# Patient Record
Sex: FEMALE | Race: WHITE | Employment: OTHER | URBAN - METROPOLITAN AREA
[De-identification: names, ages, dates, MRNs, and addresses within clinical notes are randomized per-mention and may not be internally consistent; named-entity substitution may affect disease eponyms.]

---

## 2023-03-03 LAB
NON-UH HIE A/G RATIO: 1.3
NON-UH HIE ALB: 3.7 G/DL (ref 3.4–5)
NON-UH HIE ALK PHOS: 71 UNIT/L (ref 46–116)
NON-UH HIE BILIRUBIN, TOTAL: 0.4 MG/DL (ref 0.2–1)
NON-UH HIE BUN/CREAT RATIO: 15.6
NON-UH HIE BUN: 14 MG/DL (ref 9–23)
NON-UH HIE CALCIUM: 9.1 MG/DL (ref 8.7–10.4)
NON-UH HIE CALCULATED LDL CHOLESTEROL: 53 MG/DL (ref 60–130)
NON-UH HIE CALCULATED OSMOLALITY: 284 MOSM/KG (ref 275–295)
NON-UH HIE CHLORIDE: 107 MMOL/L (ref 98–107)
NON-UH HIE CHOLESTEROL: 144 MG/DL (ref 100–200)
NON-UH HIE CO2, VENOUS: 31 MMOL/L (ref 20–31)
NON-UH HIE CREATININE: 0.9 MG/DL (ref 0.5–0.8)
NON-UH HIE GFR AA: >60
NON-UH HIE GLOBULIN: 2.9 G/DL
NON-UH HIE GLOMERULAR FILTRATION RATE: >60 ML/MIN/1.73M?
NON-UH HIE GLUCOSE: 97 MG/DL (ref 74–106)
NON-UH HIE GOT: 15 UNIT/L (ref 15–37)
NON-UH HIE GPT: 11 UNIT/L (ref 10–49)
NON-UH HIE HCT: 40.3 % (ref 36–46)
NON-UH HIE HDL CHOLESTEROL: 77 MG/DL (ref 40–60)
NON-UH HIE HGB: 13.3 G/DL (ref 12–16)
NON-UH HIE INSTR WBC ND: 4.9
NON-UH HIE K: 4.4 MMOL/L (ref 3.5–5.1)
NON-UH HIE MCH: 28.7 PG (ref 27–34)
NON-UH HIE MCHC: 33.2 G/DL (ref 32–37)
NON-UH HIE MCV: 86.6 FL (ref 80–100)
NON-UH HIE MPV: 8.8 FL (ref 7.4–10.4)
NON-UH HIE NA: 142 MMOL/L (ref 135–145)
NON-UH HIE PLATELET: 259 X10 (ref 150–450)
NON-UH HIE RBC: 4.65 X10 (ref 4.2–5.4)
NON-UH HIE RDW: 14.1 % (ref 11.5–14.5)
NON-UH HIE TOTAL CHOL/HDL CHOL RATIO: 1.9
NON-UH HIE TOTAL PROTEIN: 6.6 G/DL (ref 5.7–8.2)
NON-UH HIE TRIGLYCERIDES: 71 MG/DL (ref 30–150)
NON-UH HIE TSH: 2.55 UIU/ML (ref 0.55–4.78)
NON-UH HIE VIT D 25: 21 NG/ML
NON-UH HIE WBC: 4.9 X10 (ref 4.5–11)

## 2023-03-14 ENCOUNTER — TELEPHONE (OUTPATIENT)
Dept: PRIMARY CARE | Facility: CLINIC | Age: 68
End: 2023-03-14
Payer: MEDICARE

## 2023-03-14 NOTE — TELEPHONE ENCOUNTER
----- Message from Gay Colón DO sent at 3/13/2023  8:33 PM EDT -----  Please let pt know that her  blood counts, sugar, electrolytes, thyroid, and liver function are all normal and her cholesterol was good.   Her kidney function was slightly elevated at 0.9 (should be less than 0.8).  We will continue to monitor.  Her vitamin D level is low at 21(should be above 30).  I recommend they  take at least 2000 units of an OTC vitamin D supplement daily and we will cont to monitor.      ----- Message -----  From: Mary Moyer CMA  Sent: 3/13/2023   8:23 AM EDT  To: Gay Colón DO

## 2023-03-29 ENCOUNTER — TELEPHONE (OUTPATIENT)
Dept: PRIMARY CARE | Facility: CLINIC | Age: 68
End: 2023-03-29
Payer: MEDICARE

## 2023-03-29 NOTE — TELEPHONE ENCOUNTER
----- Message from Gay Colón DO sent at 3/28/2023  3:06 PM EDT -----  Let pt know her mammogram was normal.  Repeat in 1 year.

## 2023-06-30 DIAGNOSIS — U07.1 COVID-19: Primary | ICD-10-CM

## 2023-06-30 RX ORDER — NIRMATRELVIR AND RITONAVIR 300-100 MG
3 KIT ORAL 2 TIMES DAILY
Qty: 30 TABLET | Refills: 0 | Status: SHIPPED | OUTPATIENT
Start: 2023-06-30 | End: 2023-07-05

## 2024-01-23 DIAGNOSIS — M05.79 RHEUMATOID ARTHRITIS OF MULTIPLE SITES WITHOUT ORGAN OR SYSTEM INVOLVEMENT WITH POSITIVE RHEUMATOID FACTOR (MULTI): Primary | ICD-10-CM

## 2024-01-23 PROBLEM — M81.0 OSTEOPOROSIS: Status: ACTIVE | Noted: 2024-01-23

## 2024-01-23 PROBLEM — R79.89 LOW VITAMIN D LEVEL: Status: ACTIVE | Noted: 2024-01-23

## 2024-01-23 RX ORDER — HYDROXYCHLOROQUINE SULFATE 200 MG/1
TABLET, FILM COATED ORAL DAILY
Qty: 90 TABLET | Refills: 3 | Status: SHIPPED | OUTPATIENT
Start: 2024-01-23

## 2024-01-23 RX ORDER — CALCIUM CARBONATE 600 MG
1 TABLET ORAL DAILY
COMMUNITY
Start: 2021-02-11

## 2024-01-23 RX ORDER — IBUPROFEN 200 MG
1 TABLET ORAL 3 TIMES DAILY PRN
COMMUNITY
Start: 2019-01-08

## 2024-01-23 RX ORDER — VIT C/E/ZN/COPPR/LUTEIN/ZEAXAN 250MG-90MG
25 CAPSULE ORAL DAILY
COMMUNITY
Start: 2019-03-12

## 2024-02-13 PROBLEM — Z51.81 ENCOUNTER FOR MONITORING OF HYDROXYCHLOROQUINE THERAPY: Status: ACTIVE | Noted: 2024-02-13

## 2024-02-13 PROBLEM — Z79.899 ENCOUNTER FOR MONITORING OF HYDROXYCHLOROQUINE THERAPY: Status: ACTIVE | Noted: 2024-02-13

## 2024-02-15 ENCOUNTER — OFFICE VISIT (OUTPATIENT)
Dept: RHEUMATOLOGY | Facility: CLINIC | Age: 69
End: 2024-02-15
Payer: MEDICARE

## 2024-02-15 VITALS
HEART RATE: 79 BPM | WEIGHT: 191.4 LBS | HEIGHT: 66 IN | BODY MASS INDEX: 30.76 KG/M2 | SYSTOLIC BLOOD PRESSURE: 142 MMHG | TEMPERATURE: 97 F | OXYGEN SATURATION: 99 % | DIASTOLIC BLOOD PRESSURE: 93 MMHG

## 2024-02-15 DIAGNOSIS — Z79.899 ENCOUNTER FOR MONITORING OF HYDROXYCHLOROQUINE THERAPY: ICD-10-CM

## 2024-02-15 DIAGNOSIS — M05.79 RHEUMATOID ARTHRITIS OF MULTIPLE SITES WITHOUT ORGAN OR SYSTEM INVOLVEMENT WITH POSITIVE RHEUMATOID FACTOR (MULTI): Primary | ICD-10-CM

## 2024-02-15 DIAGNOSIS — Z51.81 ENCOUNTER FOR MONITORING OF HYDROXYCHLOROQUINE THERAPY: ICD-10-CM

## 2024-02-15 PROCEDURE — 1125F AMNT PAIN NOTED PAIN PRSNT: CPT | Performed by: INTERNAL MEDICINE

## 2024-02-15 PROCEDURE — 1159F MED LIST DOCD IN RCRD: CPT | Performed by: INTERNAL MEDICINE

## 2024-02-15 PROCEDURE — 1036F TOBACCO NON-USER: CPT | Performed by: INTERNAL MEDICINE

## 2024-02-15 PROCEDURE — 1123F ACP DISCUSS/DSCN MKR DOCD: CPT | Performed by: INTERNAL MEDICINE

## 2024-02-15 PROCEDURE — 1160F RVW MEDS BY RX/DR IN RCRD: CPT | Performed by: INTERNAL MEDICINE

## 2024-02-15 PROCEDURE — 99214 OFFICE O/P EST MOD 30 MIN: CPT | Performed by: INTERNAL MEDICINE

## 2024-02-15 ASSESSMENT — PATIENT HEALTH QUESTIONNAIRE - PHQ9
SUM OF ALL RESPONSES TO PHQ9 QUESTIONS 1 & 2: 0
1. LITTLE INTEREST OR PLEASURE IN DOING THINGS: NOT AT ALL
2. FEELING DOWN, DEPRESSED OR HOPELESS: NOT AT ALL

## 2024-02-15 ASSESSMENT — ENCOUNTER SYMPTOMS
NUMBNESS: 0
FATIGUE: 0
COUGH: 0
WEAKNESS: 0
STIFFNESS: 1
ARTHRALGIAS: 1
SHORTNESS OF BREATH: 0
MYALGIAS: 0
JOINT SWELLING: 0
BACK PAIN: 0
COLOR CHANGE: 0
FEVER: 0

## 2024-02-15 NOTE — ASSESSMENT & PLAN NOTE
Well controlled with plaquenil.   Meets remission criteria with only minimal AM stiffness  Continue meds.  Labs to be done by PCP next month.  Eye exam scheduled for next month

## 2024-02-15 NOTE — PROGRESS NOTES
Chief Complaint   Patient presents with    Follow-up    Rheumatoid Arthritis       SUBJECTIVE  Arthritis  Presents for follow-up visit. She complains of stiffness. She reports no pain or joint swelling. The symptoms have been stable. Pertinent negatives include no fatigue, fever or rash. (Overall doing well.  Wakes up with stiffness but gone by the time she eats breakfast.  No problems   Had one flare in the last year that was treated with steroid taper and doing well ever since) Compliance with total regimen is %. Compliance with medications is %.     Patient Active Problem List    Diagnosis Date Noted    Encounter for monitoring of hydroxychloroquine therapy 02/13/2024    Low vitamin D level 01/23/2024    Osteoporosis 01/23/2024    Rheumatoid arthritis of multiple sites without organ or system involvement with positive rheumatoid factor (CMS/[facility]) 01/23/2024     Past Medical History:   Diagnosis Date    Colon polyp 02/15/2008    Formatting of this note might be different from the original. Tubular adenoma 2006 repeat in 2011    Consumes 2 to 3 servings of caffeine per day     De Quervain's disease (radial styloid tenosynovitis) 11/22/2015    Tendinitis 12/10/2015    History of tendinitis    Uterine fibroid 02/15/2008    Uterine prolapse 01/29/2008     Current Outpatient Medications   Medication Instructions    calcium carbonate 600 mg calcium (1,500 mg) tablet 1 tablet, oral, Daily    cholecalciferol (VITAMIN D-3) 25 mcg, oral, Daily    hydroxychloroquine (Plaquenil) 200 mg tablet oral, Daily    ibuprofen (AdviL) 200 mg tablet 1 tablet, oral, 3 times daily PRN     Allergies   Allergen Reactions    Penicillins Hives, Itching and Other     11/07/2005 UNKNOWN, PLEASE VERIFY, 11/07/2005 UNKNOWN, PLEASE VERIFY    Shrimp Unknown     Review of Systems   Constitutional:  Negative for fatigue and fever.   Respiratory:  Negative for cough and shortness of breath.    Cardiovascular:  Negative for leg swelling.  "  Musculoskeletal:  Positive for arthralgias, arthritis and stiffness. Negative for back pain, gait problem, joint swelling and myalgias.   Skin:  Negative for color change and rash.   Neurological:  Negative for weakness and numbness.       PHYSICAL EXAM  BP (!) 142/93 (BP Location: Left arm, Patient Position: Sitting, BP Cuff Size: Adult)   Pulse 79   Temp 36.1 °C (97 °F) (Temporal)   Ht 1.676 m (5' 6\")   Wt 86.8 kg (191 lb 6.4 oz)   SpO2 99%   BMI 30.89 kg/m²   Physical Exam  Vitals reviewed.   Constitutional:       General: She is not in acute distress.     Appearance: Normal appearance.   HENT:      Head: Normocephalic and atraumatic.   Eyes:      Conjunctiva/sclera: Conjunctivae normal.   Pulmonary:      Effort: Pulmonary effort is normal. No respiratory distress.   Musculoskeletal:         General: No swelling, tenderness or deformity.      Cervical back: Normal range of motion.      Right lower leg: No edema.      Left lower leg: No edema.      Comments: No synovitis of examined joints   Skin:     Coloration: Skin is not pale.      Findings: No erythema or rash.   Neurological:      General: No focal deficit present.      Mental Status: She is alert and oriented to person, place, and time. Mental status is at baseline.      Gait: Gait normal.   Psychiatric:         Mood and Affect: Mood normal.         Assessment/plan  Problem List Items Addressed This Visit       Rheumatoid arthritis of multiple sites without organ or system involvement with positive rheumatoid factor (CMS/[facility]) - Primary    Current Assessment & Plan     Well controlled with plaquenil.   Meets remission criteria with only minimal AM stiffness  Continue meds.  Labs to be done by PCP next month.  Eye exam scheduled for next month         Encounter for monitoring of hydroxychloroquine therapy    Overview     Eye exam 3/23         Current Assessment & Plan     You are on chronic plaquenil.     Make sure you see your eye doctor yearly.  If " you need an eye doctor, let me know and I can place a referral    Plaquenil is dosed based on your weight.   We will make sure your dose is below the maximum daily dose of 5mg/kg            Follow up: ___12__months

## 2024-02-15 NOTE — PATIENT INSTRUCTIONS
It was a pleasure to see you today  Please call if your symptoms worsen  Please review your summary for education and reminders.  Follow up at your next appointment.    If you had labs/xrays done today, you will be able to view on [employer].   We will contact you when the results are reviewed for further discussion.  Please note that you may receive your results before I have had a chance to review.  Please know I will be contacting you for discussion  Homegoing instructions for all patient  A healthy lifestyle helps chronic diseases  These are all the goals you should strive to improve your overall health   Blood pressure <130/85   BMI of <30 or waist circumference that is 1/2 of your height   Fasting blood sugar <107 (if you are diabetic, aim for an A1c <6.4%_   LDL cholesterol <130   Avoid smoking   Manage your stress   Get your preventive exams   Get your immunizations

## 2024-02-15 NOTE — LETTER
February 15, 2024     Gay Colón DO  [id number] [address]  [id number]  [place] [address]    Patient: Karly Boothe   YOB: 1955   Date of Visit: 2/15/2024       Dear Dr. Gay Colón DO:    Thank you for referring Karly Boothe to me for evaluation. Below are my notes for this consultation.  If you have questions, please do not hesitate to call me. I look forward to following your patient along with you.       Sincerely,     Jen Sanchez MD      CC: No Recipients  ______________________________________________________________________________________    Chief Complaint   Patient presents with   • Follow-up   • Rheumatoid Arthritis       SUBJECTIVE  Arthritis  Presents for follow-up visit. She complains of stiffness. She reports no pain or joint swelling. The symptoms have been stable. Pertinent negatives include no fatigue, fever or rash. (Overall doing well.  Wakes up with stiffness but gone by the time she eats breakfast.  No problems   Had one flare in the last year that was treated with steroid taper and doing well ever since) Compliance with total regimen is %. Compliance with medications is %.     Patient Active Problem List    Diagnosis Date Noted   • Encounter for monitoring of hydroxychloroquine therapy 02/13/2024   • Low vitamin D level 01/23/2024   • Osteoporosis 01/23/2024   • Rheumatoid arthritis of multiple sites without organ or system involvement with positive rheumatoid factor (CMS/[facility]) 01/23/2024     Past Medical History:   Diagnosis Date   • Colon polyp 02/15/2008    Formatting of this note might be different from the original. Tubular adenoma 2006 repeat in 2011   • Consumes 2 to 3 servings of caffeine per day    • De Quervain's disease (radial styloid tenosynovitis) 11/22/2015   • Tendinitis 12/10/2015    History of tendinitis   • Uterine fibroid 02/15/2008   • Uterine prolapse 01/29/2008     Current Outpatient Medications   Medication Instructions   •  "calcium carbonate 600 mg calcium (1,500 mg) tablet 1 tablet, oral, Daily   • cholecalciferol (VITAMIN D-3) 25 mcg, oral, Daily   • hydroxychloroquine (Plaquenil) 200 mg tablet oral, Daily   • ibuprofen (AdviL) 200 mg tablet 1 tablet, oral, 3 times daily PRN     Allergies   Allergen Reactions   • Penicillins Hives, Itching and Other     11/07/2005 UNKNOWN, PLEASE VERIFY, 11/07/2005 UNKNOWN, PLEASE VERIFY   • Shrimp Unknown     Review of Systems   Constitutional:  Negative for fatigue and fever.   Respiratory:  Negative for cough and shortness of breath.    Cardiovascular:  Negative for leg swelling.   Musculoskeletal:  Positive for arthralgias, arthritis and stiffness. Negative for back pain, gait problem, joint swelling and myalgias.   Skin:  Negative for color change and rash.   Neurological:  Negative for weakness and numbness.       PHYSICAL EXAM  BP (!) 142/93 (BP Location: Left arm, Patient Position: Sitting, BP Cuff Size: Adult)   Pulse 79   Temp 36.1 °C (97 °F) (Temporal)   Ht 1.676 m (5' 6\")   Wt 86.8 kg (191 lb 6.4 oz)   SpO2 99%   BMI 30.89 kg/m²   Physical Exam  Vitals reviewed.   Constitutional:       General: She is not in acute distress.     Appearance: Normal appearance.   HENT:      Head: Normocephalic and atraumatic.   Eyes:      Conjunctiva/sclera: Conjunctivae normal.   Pulmonary:      Effort: Pulmonary effort is normal. No respiratory distress.   Musculoskeletal:         General: No swelling, tenderness or deformity.      Cervical back: Normal range of motion.      Right lower leg: No edema.      Left lower leg: No edema.      Comments: No synovitis of examined joints   Skin:     Coloration: Skin is not pale.      Findings: No erythema or rash.   Neurological:      General: No focal deficit present.      Mental Status: She is alert and oriented to person, place, and time. Mental status is at baseline.      Gait: Gait normal.   Psychiatric:         Mood and Affect: Mood normal. "         Assessment/plan  Problem List Items Addressed This Visit       Rheumatoid arthritis of multiple sites without organ or system involvement with positive rheumatoid factor (CMS/HCC) - Primary    Current Assessment & Plan     Well controlled with plaquenil.   Meets remission criteria with only minimal AM stiffness  Continue meds.  Labs to be done by PCP next month.  Eye exam scheduled for next month         Encounter for monitoring of hydroxychloroquine therapy    Overview     Eye exam 3/23         Current Assessment & Plan     You are on chronic plaquenil.     Make sure you see your eye doctor yearly.  If you need an eye doctor, let me know and I can place a referral    Plaquenil is dosed based on your weight.   We will make sure your dose is below the maximum daily dose of 5mg/kg            Follow up: ___12__months

## 2024-02-25 NOTE — PROGRESS NOTES
"Subjective   Reason for Visit: Kalpana Boothe is an 68 y.o. female here for a Medicare Wellness visit.     Past Medical, Surgical, and Family History reviewed and updated in chart.    Reviewed all medications by prescribing practitioner or clinical pharmacist (such as prescriptions, OTCs, herbal therapies and supplements) and documented in the medical record.    HPI    69 yo female presents for medicare visit     BP was up at rheum recently   Has been checking at home and has been occ elevated.     last pap 2/2020 which was negative. hx of abnormal paps yrs ago. had EMB and colposcopy.   LMP at 55 yo. no postmenopausal bleeding or pelvic pain   does occ self breast exams. no obvious changes  last mammo 3/2023-neg      BMI 30  exercise-walking.   eats a healthy diet     3/2021 DEXA +osteoporosis - defers rx med in past. Will Repeat  taking Ca and vit D supplement  hx vit d defic         hx polyps. 8/2017 colonoscopy. f/u 10 yrs. GI vollweiler.   Denies any changes in bowel habits, abdominal pain, diarrhea, constipation, blood in stool, melena.      Tetanus 2020  Zostavax 2014; shingrix- not yet;   Flu shot- had  Prevnar- 2021  Penumovax-2022  Covid shots- had and had booster  RSV- not yet     sees optho. on plaquenil.   She has seen her dentist for regular cleanings      She denies any chest pains, palpitations, edema, shortness of breath, abdominal pains, reflux, nausea, vomiting, diarrhea, constipation, blood in stool, melena.      Denies any mole changes. sees derm for skin checks derm assoc;      hx RA- diagnosed 3/2016- sees rheum- [place]- on hydroxychloroquine and uses motrin prn     Has noticed some hearing changes    /78   Pulse 76   Temp 36.4 °C (97.6 °F)   Ht 1.676 m (5' 6\")   Wt 85.7 kg (189 lb)   BMI 30.51 kg/m²   Patient Self Assessment of Health Status  Patient Self Assessment: Good    Nutrition and Exercise  Current Diet: Well Balanced Diet  Adequate Fluid Intake: Yes  Caffeine: " "Yes  Exercise Frequency: Infrequently    Functional Ability/Level of Safety  Cognitive Impairment Observed: No cognitive impairment observed    Home Safety Risk Factors: None    Patient Care Team:  Gay Colón DO as PCP - General  Gay Colón DO as PCP - [employer] PCP  Jen Sanchez MD as Referring Physician (Rheumatology)     Review of Systems  ROS as stated in HPI    Medicare Wellness Billing Compliance Satisfied    *This is a visual tool to show completion of required items on the day of the visit. Green checks will only appear on the date of visit.      Objective   Vitals:  /78   Pulse 76   Temp 36.4 °C (97.6 °F)   Ht 1.676 m (5' 6\")   Wt 85.7 kg (189 lb)   BMI 30.51 kg/m²       Physical Exam     Constitutional: A&O; NAD  HEENT: conjunctiva normal. EOMI; PERRLA; lids normal; TM's clear;   Neck: supple; No lymphadenopathy   Heart: RRR     Lungs: CTA bilateral   Abd: Soft, Nontender, Nondistended  Ext:  No edema;    Neuro: No gross neuro deficits     Psych: Judgment, orientation, mood, affect, insight wnl   Assessment/Plan   Problem List Items Addressed This Visit    None  Visit Diagnoses       Medicare annual wellness visit, subsequent    -  Primary    Breast cancer screening by mammogram        Relevant Orders    BI mammo bilateral screening tomosynthesis (Completed)    Postmenopausal        Relevant Orders    XR DEXA bone density (Completed)    Hyperlipidemia, unspecified hyperlipidemia type        Relevant Orders    Comprehensive Metabolic Panel    Lipid Panel    Other fatigue        Relevant Orders    CBC    TSH with reflex to Free T4 if abnormal    Vitamin D deficiency        Relevant Orders    Vitamin D 25-Hydroxy,Total (for eval of Vitamin D levels)          monthly self breast exams.   check mammogram in march.   8/2017 colonoscopy - f/u 10 yrs  tdap 2020  Zostavax 10/2014; shingrix recommended-pt considering  RSV recommended  other immunizations are UTD  3/2021 " DEXA +osteoporosis; cont Calcium + Vitamin D supplement. repeat  healthy lifestyle-diet, exercise.   check labs today  sees rheum  Fu with audiologist for hearing eval

## 2024-02-27 ENCOUNTER — OFFICE VISIT (OUTPATIENT)
Dept: PRIMARY CARE | Facility: CLINIC | Age: 69
End: 2024-02-27
Payer: MEDICARE

## 2024-02-27 ENCOUNTER — HOSPITAL ENCOUNTER (OUTPATIENT)
Dept: RADIOLOGY | Facility: EXTERNAL LOCATION | Age: 69
Discharge: HOME | End: 2024-02-27

## 2024-02-27 VITALS
BODY MASS INDEX: 30.37 KG/M2 | TEMPERATURE: 97.6 F | DIASTOLIC BLOOD PRESSURE: 78 MMHG | WEIGHT: 189 LBS | HEART RATE: 76 BPM | HEIGHT: 66 IN | SYSTOLIC BLOOD PRESSURE: 127 MMHG

## 2024-02-27 DIAGNOSIS — Z12.31 BREAST CANCER SCREENING BY MAMMOGRAM: ICD-10-CM

## 2024-02-27 DIAGNOSIS — Z00.00 MEDICARE ANNUAL WELLNESS VISIT, SUBSEQUENT: Primary | ICD-10-CM

## 2024-02-27 DIAGNOSIS — Z78.0 POSTMENOPAUSAL: ICD-10-CM

## 2024-02-27 DIAGNOSIS — R53.83 OTHER FATIGUE: ICD-10-CM

## 2024-02-27 DIAGNOSIS — E78.5 HYPERLIPIDEMIA, UNSPECIFIED HYPERLIPIDEMIA TYPE: ICD-10-CM

## 2024-02-27 DIAGNOSIS — E55.9 VITAMIN D DEFICIENCY: ICD-10-CM

## 2024-02-27 LAB
NON-UH HIE A/G RATIO: 1.2
NON-UH HIE ALB: 4 G/DL (ref 3.4–5)
NON-UH HIE ALK PHOS: 82 UNIT/L (ref 45–117)
NON-UH HIE BILIRUBIN, TOTAL: 0.4 MG/DL (ref 0.3–1.2)
NON-UH HIE BUN/CREAT RATIO: 16.7
NON-UH HIE BUN: 15 MG/DL (ref 9–23)
NON-UH HIE CALCIUM: 9.8 MG/DL (ref 8.7–10.4)
NON-UH HIE CALCULATED LDL CHOLESTEROL: 63 MG/DL (ref 60–130)
NON-UH HIE CALCULATED OSMOLALITY: 276 MOSM/KG (ref 275–295)
NON-UH HIE CHLORIDE: 109 MMOL/L (ref 98–107)
NON-UH HIE CHOLESTEROL: 168 MG/DL (ref 100–200)
NON-UH HIE CO2, VENOUS: 29 MMOL/L (ref 20–31)
NON-UH HIE CREATININE: 0.9 MG/DL (ref 0.5–0.8)
NON-UH HIE GFR AA: >60
NON-UH HIE GLOBULIN: 3.2 G/DL
NON-UH HIE GLOMERULAR FILTRATION RATE: >60 ML/MIN/1.73M?
NON-UH HIE GLUCOSE: 93 MG/DL (ref 74–106)
NON-UH HIE GOT: 20 UNIT/L (ref 15–37)
NON-UH HIE GPT: 19 UNIT/L (ref 10–49)
NON-UH HIE HCT: 42.1 % (ref 36–46)
NON-UH HIE HDL CHOLESTEROL: 89 MG/DL (ref 40–60)
NON-UH HIE HGB: 14.1 G/DL (ref 12–16)
NON-UH HIE INSTR WBC ND: 5.6
NON-UH HIE K: 4.2 MMOL/L (ref 3.5–5.1)
NON-UH HIE MCH: 29.3 PG (ref 27–34)
NON-UH HIE MCHC: 33.6 G/DL (ref 32–37)
NON-UH HIE MCV: 87.4 FL (ref 80–100)
NON-UH HIE MPV: 8.5 FL (ref 7.4–10.4)
NON-UH HIE NA: 138 MMOL/L (ref 135–145)
NON-UH HIE PLATELET: 284 X10 (ref 150–450)
NON-UH HIE RBC: 4.82 X10 (ref 4.2–5.4)
NON-UH HIE RDW: 13.9 % (ref 11.5–14.5)
NON-UH HIE TOTAL CHOL/HDL CHOL RATIO: 1.9
NON-UH HIE TOTAL PROTEIN: 7.2 G/DL (ref 5.7–8.2)
NON-UH HIE TRIGLYCERIDES: 80 MG/DL (ref 30–150)
NON-UH HIE TSH: 1.93 UIU/ML (ref 0.55–4.78)
NON-UH HIE VIT D 25: 26 NG/ML
NON-UH HIE WBC: 5.6 X10 (ref 4.5–11)

## 2024-02-27 PROCEDURE — 1158F ADVNC CARE PLAN TLK DOCD: CPT | Performed by: FAMILY MEDICINE

## 2024-02-27 PROCEDURE — 1170F FXNL STATUS ASSESSED: CPT | Performed by: FAMILY MEDICINE

## 2024-02-27 PROCEDURE — 1125F AMNT PAIN NOTED PAIN PRSNT: CPT | Performed by: FAMILY MEDICINE

## 2024-02-27 PROCEDURE — G0439 PPPS, SUBSEQ VISIT: HCPCS | Performed by: FAMILY MEDICINE

## 2024-02-27 PROCEDURE — 1160F RVW MEDS BY RX/DR IN RCRD: CPT | Performed by: FAMILY MEDICINE

## 2024-02-27 PROCEDURE — 1036F TOBACCO NON-USER: CPT | Performed by: FAMILY MEDICINE

## 2024-02-27 PROCEDURE — 1159F MED LIST DOCD IN RCRD: CPT | Performed by: FAMILY MEDICINE

## 2024-02-27 PROCEDURE — 1123F ACP DISCUSS/DSCN MKR DOCD: CPT | Performed by: FAMILY MEDICINE

## 2024-02-27 ASSESSMENT — ACTIVITIES OF DAILY LIVING (ADL)
DOING_HOUSEWORK: INDEPENDENT
TAKING_MEDICATION: INDEPENDENT
MANAGING_FINANCES: INDEPENDENT
GROCERY_SHOPPING: INDEPENDENT
DRESSING: INDEPENDENT
BATHING: INDEPENDENT

## 2024-02-27 ASSESSMENT — PATIENT HEALTH QUESTIONNAIRE - PHQ9
SUM OF ALL RESPONSES TO PHQ9 QUESTIONS 1 AND 2: 0
1. LITTLE INTEREST OR PLEASURE IN DOING THINGS: NOT AT ALL
2. FEELING DOWN, DEPRESSED OR HOPELESS: NOT AT ALL

## 2024-02-29 ENCOUNTER — TELEPHONE (OUTPATIENT)
Dept: PRIMARY CARE | Facility: CLINIC | Age: 69
End: 2024-02-29
Payer: MEDICARE

## 2024-02-29 NOTE — TELEPHONE ENCOUNTER
----- Message from Gay Colón DO sent at 2/27/2024  9:32 PM EST -----  Please let pt know her bone density test  again shows osteoporosis but it has gotten slightly worse since her last test. I recommend regular walking/wt bearing exercise and taking a daily calcium and Vit D supplement. There are prescription medications to take, like Fosamax or Boniva, or injections, like Prolia, that can help prevent further bone loss.  Have her let us know if she is interested in trying one of these

## 2024-02-29 NOTE — TELEPHONE ENCOUNTER
----- Message from Gay Colón DO sent at 2/27/2024  9:53 PM EST -----  Please let pt know that her blood counts, sugar, electrolytes, thyroid, and liver function are all normal and her cholesterol was good. Her kidney function was slightly elevated at 0.9(should be less than 0.8).  We will continue to monitor.  Her vitamin D level is low at 26(should be above 30).  I recommend she  take at least 2000 units of an OTC vitamin D supplement daily and we will cont to monitor.

## 2024-03-18 ENCOUNTER — HOSPITAL ENCOUNTER (OUTPATIENT)
Dept: RADIOLOGY | Facility: EXTERNAL LOCATION | Age: 69
Discharge: HOME | End: 2024-03-18

## 2024-03-18 DIAGNOSIS — Z12.31 BREAST CANCER SCREENING BY MAMMOGRAM: ICD-10-CM

## 2024-06-04 DIAGNOSIS — M05.79 RHEUMATOID ARTHRITIS OF MULTIPLE SITES WITHOUT ORGAN OR SYSTEM INVOLVEMENT WITH POSITIVE RHEUMATOID FACTOR (MULTI): Primary | ICD-10-CM

## 2024-06-04 RX ORDER — PREDNISONE 10 MG/1
TABLET ORAL DAILY
Qty: 30 TABLET | Refills: 0 | Status: SHIPPED | OUTPATIENT
Start: 2024-06-04 | End: 2024-06-15

## 2024-06-20 ENCOUNTER — TELEPHONE (OUTPATIENT)
Dept: RHEUMATOLOGY | Facility: CLINIC | Age: 69
End: 2024-06-20
Payer: MEDICARE

## 2024-06-24 ENCOUNTER — APPOINTMENT (OUTPATIENT)
Dept: RHEUMATOLOGY | Facility: CLINIC | Age: 69
End: 2024-06-24
Payer: MEDICARE

## 2024-06-24 VITALS
DIASTOLIC BLOOD PRESSURE: 84 MMHG | TEMPERATURE: 97.2 F | OXYGEN SATURATION: 93 % | SYSTOLIC BLOOD PRESSURE: 128 MMHG | HEART RATE: 83 BPM | BODY MASS INDEX: 30.42 KG/M2 | WEIGHT: 189.3 LBS | HEIGHT: 66 IN

## 2024-06-24 DIAGNOSIS — M05.79 RHEUMATOID ARTHRITIS OF MULTIPLE SITES WITHOUT ORGAN OR SYSTEM INVOLVEMENT WITH POSITIVE RHEUMATOID FACTOR (MULTI): Primary | ICD-10-CM

## 2024-06-24 DIAGNOSIS — Z51.81 ENCOUNTER FOR MONITORING OF HYDROXYCHLOROQUINE THERAPY: ICD-10-CM

## 2024-06-24 DIAGNOSIS — Z79.899 ENCOUNTER FOR MONITORING OF HYDROXYCHLOROQUINE THERAPY: ICD-10-CM

## 2024-06-24 PROCEDURE — 1123F ACP DISCUSS/DSCN MKR DOCD: CPT | Performed by: INTERNAL MEDICINE

## 2024-06-24 PROCEDURE — 99214 OFFICE O/P EST MOD 30 MIN: CPT | Performed by: INTERNAL MEDICINE

## 2024-06-24 PROCEDURE — 1159F MED LIST DOCD IN RCRD: CPT | Performed by: INTERNAL MEDICINE

## 2024-06-24 PROCEDURE — 1036F TOBACCO NON-USER: CPT | Performed by: INTERNAL MEDICINE

## 2024-06-24 PROCEDURE — 1160F RVW MEDS BY RX/DR IN RCRD: CPT | Performed by: INTERNAL MEDICINE

## 2024-06-24 ASSESSMENT — PATIENT HEALTH QUESTIONNAIRE - PHQ9
2. FEELING DOWN, DEPRESSED OR HOPELESS: NOT AT ALL
SUM OF ALL RESPONSES TO PHQ9 QUESTIONS 1 AND 2: 0
1. LITTLE INTEREST OR PLEASURE IN DOING THINGS: NOT AT ALL

## 2024-06-24 ASSESSMENT — ENCOUNTER SYMPTOMS
BACK PAIN: 0
FEVER: 0
SHORTNESS OF BREATH: 0
NUMBNESS: 0
MYALGIAS: 0
ARTHRALGIAS: 1
COLOR CHANGE: 0
COUGH: 0
WEAKNESS: 0
FATIGUE: 0
JOINT SWELLING: 1

## 2024-06-24 NOTE — ASSESSMENT & PLAN NOTE
You are on chronic plaquenil.     Make sure you see your eye doctor yearly.--last done march 2024  Plaquenil is dosed based on your weight.   We will make sure your dose is below the maximum daily dose of 5mg/kg

## 2024-06-24 NOTE — ASSESSMENT & PLAN NOTE
Increased disease activity most likely due to stressors.  Has found some relief with diet modification.  Educated on the anti-inflammatory diet and pt to try and see if that can provide sustained relief

## 2024-06-24 NOTE — PATIENT INSTRUCTIONS
"It was a pleasure to see you today  Please call if your symptoms worsen  Please review your summary for education and reminders.  Follow up at your next appointment.    If you had labs/xrays done today, you will be able to view on [employer].   We will contact you when the results are reviewed for further discussion.  Please note that you may receive your results before I have had a chance to review.  Please know I will be contacting you for discussion  Homegoing instructions for all patient  A healthy lifestyle helps chronic diseases  These are all the goals you should strive to improve your overall health   Blood pressure <130/85   BMI of <30 or waist circumference that is 1/2 of your height   Fasting blood sugar <107 (if you are diabetic, aim for an A1c <6.4%_   LDL cholesterol <130   Avoid smoking   Manage your stress   Get your preventive exams   Get your immunizations   What else for RA?    Any type of exercise is better than nothing.  Whether you do cardio, walking, lift weights or yoga, all can help in improving physical function.  Occupational and physical therapy can improve physical function and decrease pain by providing tools and techniques to improve your day.  We can do a referral if you haven't seen one yet  Braces and splints for affected joints can also help  If your gait is affected, strongly recommend assistive devices like canes or walkers.  We don't want to risk injuries from falls.  Can Diet help?   Consider the Mediterranean diet  There are some foods that are considered \"inflammatory\"  Look up anti-inflammatory diets for a list of foods to avoid.  No dietary supplements help unfortunately.  Work on getting to a normal weight/BMI.  This will lessen the stress on your joints.  What else?   Acupuncture   Massage therapy   Apply heat to affected joints  We do not recommend chiropractic care as it has not been shown to help in RA.  If you smoke, stop     (From 2022ACR guidelines for exercise, rehab " and diet in RA)

## 2024-07-16 DIAGNOSIS — M05.79 RHEUMATOID ARTHRITIS OF MULTIPLE SITES WITHOUT ORGAN OR SYSTEM INVOLVEMENT WITH POSITIVE RHEUMATOID FACTOR (MULTI): Primary | ICD-10-CM

## 2024-07-16 RX ORDER — PREDNISONE 10 MG/1
TABLET ORAL
Qty: 30 TABLET | Refills: 0 | Status: SHIPPED | OUTPATIENT
Start: 2024-07-16 | End: 2024-07-27

## 2024-07-29 DIAGNOSIS — M05.79 RHEUMATOID ARTHRITIS OF MULTIPLE SITES WITHOUT ORGAN OR SYSTEM INVOLVEMENT WITH POSITIVE RHEUMATOID FACTOR (MULTI): Primary | ICD-10-CM

## 2024-08-06 ENCOUNTER — OFFICE VISIT (OUTPATIENT)
Dept: PRIMARY CARE | Facility: CLINIC | Age: 69
End: 2024-08-06
Payer: MEDICARE

## 2024-08-06 VITALS
TEMPERATURE: 97.9 F | DIASTOLIC BLOOD PRESSURE: 82 MMHG | BODY MASS INDEX: 30.15 KG/M2 | WEIGHT: 187.6 LBS | HEIGHT: 66 IN | HEART RATE: 86 BPM | SYSTOLIC BLOOD PRESSURE: 128 MMHG

## 2024-08-06 DIAGNOSIS — R19.7 DIARRHEA, UNSPECIFIED TYPE: ICD-10-CM

## 2024-08-06 DIAGNOSIS — M25.531 RIGHT WRIST PAIN: ICD-10-CM

## 2024-08-06 DIAGNOSIS — R10.11 RUQ ABDOMINAL PAIN: Primary | ICD-10-CM

## 2024-08-06 LAB
NON-UH HIE A/G RATIO: 1.3
NON-UH HIE ALB: 3.7 G/DL (ref 3.4–5)
NON-UH HIE ALK PHOS: 67 UNIT/L (ref 45–117)
NON-UH HIE BILIRUBIN, TOTAL: 0.4 MG/DL (ref 0.3–1.2)
NON-UH HIE BUN/CREAT RATIO: 16.2
NON-UH HIE BUN: 13 MG/DL (ref 9–23)
NON-UH HIE CALCIUM: 9.3 MG/DL (ref 8.7–10.4)
NON-UH HIE CALCULATED OSMOLALITY: 280 MOSM/KG (ref 275–295)
NON-UH HIE CHLORIDE: 107 MMOL/L (ref 98–107)
NON-UH HIE CO2, VENOUS: 25 MMOL/L (ref 20–31)
NON-UH HIE CREATININE: 0.8 MG/DL (ref 0.5–0.8)
NON-UH HIE GFR AA: >60
NON-UH HIE GLOBULIN: 2.9 G/DL
NON-UH HIE GLOMERULAR FILTRATION RATE: >60 ML/MIN/1.73M?
NON-UH HIE GLUCOSE: 98 MG/DL (ref 74–106)
NON-UH HIE GOT: 16 UNIT/L (ref 15–37)
NON-UH HIE GPT: 14 UNIT/L (ref 10–49)
NON-UH HIE HCT: 36 % (ref 36–46)
NON-UH HIE HGB: 12.3 G/DL (ref 12–16)
NON-UH HIE INSTR WBC ND: 6.6
NON-UH HIE K: 3.9 MMOL/L (ref 3.5–5.1)
NON-UH HIE MCH: 29.5 PG (ref 27–34)
NON-UH HIE MCHC: 34.2 G/DL (ref 32–37)
NON-UH HIE MCV: 86.2 FL (ref 80–100)
NON-UH HIE MPV: 8.6 FL (ref 7.4–10.4)
NON-UH HIE NA: 140 MMOL/L (ref 135–145)
NON-UH HIE PLATELET: 247 X10 (ref 150–450)
NON-UH HIE RBC: 4.18 X10 (ref 4.2–5.4)
NON-UH HIE RDW: 14 % (ref 11.5–14.5)
NON-UH HIE TOTAL PROTEIN: 6.6 G/DL (ref 5.7–8.2)
NON-UH HIE WBC: 6.6 X10 (ref 4.5–11)

## 2024-08-06 PROCEDURE — 99214 OFFICE O/P EST MOD 30 MIN: CPT | Performed by: FAMILY MEDICINE

## 2024-08-06 PROCEDURE — 1123F ACP DISCUSS/DSCN MKR DOCD: CPT | Performed by: FAMILY MEDICINE

## 2024-08-06 PROCEDURE — 1036F TOBACCO NON-USER: CPT | Performed by: FAMILY MEDICINE

## 2024-08-06 PROCEDURE — 1160F RVW MEDS BY RX/DR IN RCRD: CPT | Performed by: FAMILY MEDICINE

## 2024-08-06 PROCEDURE — 3008F BODY MASS INDEX DOCD: CPT | Performed by: FAMILY MEDICINE

## 2024-08-06 PROCEDURE — 1159F MED LIST DOCD IN RCRD: CPT | Performed by: FAMILY MEDICINE

## 2024-08-06 PROCEDURE — 1158F ADVNC CARE PLAN TLK DOCD: CPT | Performed by: FAMILY MEDICINE

## 2024-08-06 NOTE — PROGRESS NOTES
"Subjective   Patient ID: Karly Boothe is a 68 y.o. female who presents for Abdominal Pain (RUQ abdominal pain with watery diarrhea since last night.  She started in June with foot and wrist pain and was given prednisone and ibuprofen.  ).    HPI   68-year-old female presents complaining of loose watery stools yesterday.  Had episode in the morning and in the evening.  No blood in stool.  Developed right upper quadrant abdominal pain last night.  Constant.  No associated nausea, vomiting, reflux.  Appetite okay.  No urinary symptoms.  No rash.  No history of gallbladder issues.  Has not had a bowel movement yet this morning.  Also complains of right wrist pain.  Has discussed with her rheumatologist who treated her with 2 courses of prednisone.  Had x-ray which showed degenerative changes.  Recommended he follow-up with Ortho.  He has appointment with Ortho last but not until the end of the month.  Has been wearing a brace.  No known injury.  Does have history of rheumatoid arthritis.  Has been using ibuprofen.  No sick contacts.  No undercooked foods.  hx polyps. 8/2017 colonoscopy. f/u 10 yrs. GI vollweiler.             Review of Systems  ROS as stated in HPI  Objective   /82   Pulse 86   Temp 36.6 °C (97.9 °F)   Ht 1.676 m (5' 6\")   Wt 85.1 kg (187 lb 9.6 oz)   BMI 30.28 kg/m²     Physical Exam  Constitutional: A&O; NAD  HEENT: conjunctiva normal. EOMI; PERRLA; lids normal; TM's clear;   Neck: supple; No lymphadenopathy   Heart: RRR     Lungs: CTA bilateral   Abd: Soft, mild right upper quadrant tenderness, Nondistended; no rash   Neuro: No gross neuro deficits     Psych: Judgment, orientation, mood, affect, insight wnl  Assessment/Plan   Problem List Items Addressed This Visit    None  Visit Diagnoses         Codes    RUQ abdominal pain    -  Primary R10.11    Relevant Orders    US biliary system    CBC    Comprehensive Metabolic Panel    Diarrhea, unspecified type     R19.7    Relevant Orders    " CBC    Comprehensive Metabolic Panel    Right wrist pain     M25.531    Relevant Orders    Referral to Orthopaedic Surgery            Check labs  Check RUQ US  Fu with GI if diarrha persist.  Stay hydrated.  Follow-up with Ortho .  Monitor blood pressure.

## 2024-08-07 ENCOUNTER — TELEPHONE (OUTPATIENT)
Dept: PRIMARY CARE | Facility: CLINIC | Age: 69
End: 2024-08-07
Payer: MEDICARE

## 2024-08-07 NOTE — TELEPHONE ENCOUNTER
----- Message from Gay Colón sent at 8/6/2024  6:20 PM EDT -----  Please let pt know that her blood counts, sugar, electrolytes,  liver, and kidney function are all normal

## 2024-08-13 ENCOUNTER — TELEPHONE (OUTPATIENT)
Dept: PRIMARY CARE | Facility: CLINIC | Age: 69
End: 2024-08-13
Payer: MEDICARE

## 2024-08-13 NOTE — TELEPHONE ENCOUNTER
----- Message from Gay Colón sent at 8/13/2024 10:40 AM EDT -----  Let pt know her US was normal.  See how she is feeling?

## 2025-01-17 DIAGNOSIS — M05.79 RHEUMATOID ARTHRITIS OF MULTIPLE SITES WITHOUT ORGAN OR SYSTEM INVOLVEMENT WITH POSITIVE RHEUMATOID FACTOR (MULTI): ICD-10-CM

## 2025-01-17 RX ORDER — HYDROXYCHLOROQUINE SULFATE 200 MG/1
TABLET, FILM COATED ORAL DAILY
Qty: 90 TABLET | Refills: 3 | Status: SHIPPED | OUTPATIENT
Start: 2025-01-17

## 2025-03-02 PROBLEM — Z79.1 NSAID LONG-TERM USE: Status: ACTIVE | Noted: 2025-03-02

## 2025-03-02 NOTE — PROGRESS NOTES
"Subjective   Reason for Visit: Kalpana Boothe is an 69 y.o. female here for a Medicare Wellness visit.     Past Medical, Surgical, and Family History reviewed and updated in chart.    Reviewed all medications by prescribing practitioner or clinical pharmacist (such as prescriptions, OTCs, herbal therapies and supplements) and documented in the medical record.    HPI  70 yo female presents for medicare visit     BP is slightly elevated today   is usually ok at home    last pap 2/2020 which was negative. hx of abnormal paps yrs ago. had EMB and colposcopy.   LMP at 55 yo. no postmenopausal bleeding or pelvic pain   does occ self breast exams. no obvious changes  last mammo 3/2024-neg   recently hit her right breast on a door; +bruise  BMI 30  exercise-walking. limited with pain   eats a healthy diet     2/2024 DEXA +osteoporosis - defers rx med-  taking Ca and vit D supplement  hx vit d defic         hx polyps. 8/2017 colonoscopy. f/u 10 yrs. GI vollweiler.   Denies any changes in bowel habits, abdominal pain, diarrhea, constipation, blood in stool, melena.      Tetanus 2020  Zostavax 2014; shingrix- not yet;   Flu shot- didnt get this yr  Prevnar- 2021  Penumovax-2022  Covid shots- had and had booster  RSV- not yet     sees optho. on plaquenil.   She has seen her dentist for regular cleanings      She denies any chest pains, palpitations, edema, shortness of breath, abdominal pains, reflux, nausea, vomiting, diarrhea, constipation, blood in stool, melena.      Denies any mole changes. sees derm for skin checks derm assoc;      hx RA- diagnosed 3/2016- sees rheum- [place]- on hydroxychloroquine and uses motrin prn     Has noticed some hearing changes? had testing considering hearing        /83   Pulse 84   Temp 36.8 °C (98.3 °F)   Ht 1.676 m (5' 6\")   Wt 86.6 kg (191 lb)   BMI 30.83 kg/m²     Patient Self Assessment of Health Status  Patient Self Assessment: Good    Nutrition and Exercise  Current " "Diet: Well Balanced Diet  Adequate Fluid Intake: Yes  Caffeine: Yes  Exercise Frequency: Infrequently    Functional Ability/Level of Safety  Cognitive Impairment Observed: No cognitive impairment observed    Home Safety Risk Factors: None    Patient Care Team:  Gay Colón DO as PCP - General  Gay Colón DO as PCP - [place] [employer] PCP  Jen Sanchez MD as Referring Physician (Rheumatology)     Review of Systems  ROS as stated in HPI    Medicare Wellness Billing Compliance Satisfied    *This is a visual tool to show completion of required items on the day of the visit. Green checks will only appear on the date of visit.      Objective   Vitals:  /83   Pulse 84   Temp 36.8 °C (98.3 °F)   Ht 1.676 m (5' 6\")   Wt 86.6 kg (191 lb)   BMI 30.83 kg/m²       Physical Exam  Constitutional: A&O; NAD  HEENT: conjunctiva normal. EOMI; PERRLA; lids normal; TM's clear;   Neck: supple; No lymphadenopathy   Heart: RRR     Lungs: CTA bilateral   Abd: Soft, Nontender, Nondistended  Ext:  No edema;    Neuro: No gross neuro deficits     Psych: Judgment, orientation, mood, affect, insight wnl   Assessment/Plan   Problem List Items Addressed This Visit       Low vitamin D level     Other Visit Diagnoses       Medicare annual wellness visit, subsequent    -  Primary    Breast cancer screening by mammogram        Relevant Orders    BI mammo bilateral screening tomosynthesis (Completed)    Vitamin D deficiency        Relevant Orders    Vitamin D 25-Hydroxy,Total (for eval of Vitamin D levels)    Hyperlipidemia, unspecified hyperlipidemia type        Relevant Orders    Comprehensive Metabolic Panel    Lipid Panel    Other fatigue        Relevant Orders    CBC    TSH with reflex to Free T4 if abnormal    Rheumatoid arthritis, involving unspecified site, unspecified whether rheumatoid factor present (Multi)              monitor BP  monthly self breast exams.   check mammogram in march 8/2017 colonoscopy - " f/u 10 yrs  tdap 2020  Zostavax 10/2014; shingrix recommended-pt considering  RSV recommended  flu shot- had  other immunizations are UTD  4/2024 DEXA +osteoporosis; cont Calcium + Vitamin D supplement. defers med;  will discuss with rheum  healthy lifestyle-diet, exercise.   check labs   sees rheum  had hearing eval- considering hearing aids

## 2025-03-03 ENCOUNTER — APPOINTMENT (OUTPATIENT)
Dept: PRIMARY CARE | Facility: CLINIC | Age: 70
End: 2025-03-03
Payer: MEDICARE

## 2025-03-03 VITALS
HEART RATE: 84 BPM | BODY MASS INDEX: 30.7 KG/M2 | HEIGHT: 66 IN | DIASTOLIC BLOOD PRESSURE: 83 MMHG | SYSTOLIC BLOOD PRESSURE: 142 MMHG | WEIGHT: 191 LBS | TEMPERATURE: 98.3 F

## 2025-03-03 DIAGNOSIS — M06.9 RHEUMATOID ARTHRITIS, INVOLVING UNSPECIFIED SITE, UNSPECIFIED WHETHER RHEUMATOID FACTOR PRESENT (MULTI): ICD-10-CM

## 2025-03-03 DIAGNOSIS — E78.5 HYPERLIPIDEMIA, UNSPECIFIED HYPERLIPIDEMIA TYPE: ICD-10-CM

## 2025-03-03 DIAGNOSIS — Z12.31 BREAST CANCER SCREENING BY MAMMOGRAM: ICD-10-CM

## 2025-03-03 DIAGNOSIS — R79.89 LOW VITAMIN D LEVEL: ICD-10-CM

## 2025-03-03 DIAGNOSIS — R53.83 OTHER FATIGUE: ICD-10-CM

## 2025-03-03 DIAGNOSIS — Z00.00 MEDICARE ANNUAL WELLNESS VISIT, SUBSEQUENT: Primary | ICD-10-CM

## 2025-03-03 DIAGNOSIS — E55.9 VITAMIN D DEFICIENCY: ICD-10-CM

## 2025-03-03 PROCEDURE — G0439 PPPS, SUBSEQ VISIT: HCPCS | Performed by: FAMILY MEDICINE

## 2025-03-03 PROCEDURE — 1170F FXNL STATUS ASSESSED: CPT | Performed by: FAMILY MEDICINE

## 2025-03-03 PROCEDURE — 1036F TOBACCO NON-USER: CPT | Performed by: FAMILY MEDICINE

## 2025-03-03 PROCEDURE — 1160F RVW MEDS BY RX/DR IN RCRD: CPT | Performed by: FAMILY MEDICINE

## 2025-03-03 PROCEDURE — 1123F ACP DISCUSS/DSCN MKR DOCD: CPT | Performed by: FAMILY MEDICINE

## 2025-03-03 PROCEDURE — 3008F BODY MASS INDEX DOCD: CPT | Performed by: FAMILY MEDICINE

## 2025-03-03 PROCEDURE — 1159F MED LIST DOCD IN RCRD: CPT | Performed by: FAMILY MEDICINE

## 2025-03-03 ASSESSMENT — ACTIVITIES OF DAILY LIVING (ADL)
MANAGING_FINANCES: INDEPENDENT
DOING_HOUSEWORK: INDEPENDENT
TAKING_MEDICATION: INDEPENDENT
BATHING: INDEPENDENT
GROCERY_SHOPPING: INDEPENDENT
DRESSING: INDEPENDENT

## 2025-03-03 ASSESSMENT — PATIENT HEALTH QUESTIONNAIRE - PHQ9
1. LITTLE INTEREST OR PLEASURE IN DOING THINGS: NOT AT ALL
SUM OF ALL RESPONSES TO PHQ9 QUESTIONS 1 AND 2: 0
2. FEELING DOWN, DEPRESSED OR HOPELESS: NOT AT ALL

## 2025-03-04 LAB
NON-UH HIE A/G RATIO: 0.9
NON-UH HIE ALB: 3.6 G/DL (ref 3.4–5)
NON-UH HIE ALK PHOS: 70 UNIT/L (ref 45–117)
NON-UH HIE BILIRUBIN, TOTAL: 0.4 MG/DL (ref 0.3–1.2)
NON-UH HIE BUN/CREAT RATIO: 13.3
NON-UH HIE BUN: 12 MG/DL (ref 9–23)
NON-UH HIE CALCIUM: 9.4 MG/DL (ref 8.7–10.4)
NON-UH HIE CALCULATED LDL CHOLESTEROL: 62 MG/DL (ref 60–130)
NON-UH HIE CALCULATED OSMOLALITY: 285 MOSM/KG (ref 275–295)
NON-UH HIE CHLORIDE: 105 MMOL/L (ref 98–107)
NON-UH HIE CHOLESTEROL: 163 MG/DL (ref 100–200)
NON-UH HIE CO2, VENOUS: 29 MMOL/L (ref 20–31)
NON-UH HIE CREATININE: 0.9 MG/DL (ref 0.5–0.8)
NON-UH HIE GFR AA: >60
NON-UH HIE GLOBULIN: 3.8 G/DL
NON-UH HIE GLOMERULAR FILTRATION RATE: >60 ML/MIN/1.73M?
NON-UH HIE GLUCOSE: 104 MG/DL (ref 74–106)
NON-UH HIE GOT: 19 UNIT/L (ref 15–37)
NON-UH HIE GPT: 15 UNIT/L (ref 10–49)
NON-UH HIE HCT: 37.8 % (ref 36–46)
NON-UH HIE HDL CHOLESTEROL: 86 MG/DL (ref 40–60)
NON-UH HIE HGB: 12.8 G/DL (ref 12–16)
NON-UH HIE INSTR WBC ND: 6.9
NON-UH HIE K: 5.3 MMOL/L (ref 3.5–5.1)
NON-UH HIE MCH: 28.8 PG (ref 27–34)
NON-UH HIE MCHC: 33.8 G/DL (ref 32–37)
NON-UH HIE MCV: 85.1 FL (ref 80–100)
NON-UH HIE MPV: 8.4 FL (ref 7.4–10.4)
NON-UH HIE NA: 143 MMOL/L (ref 135–145)
NON-UH HIE PLATELET: 295 X10 (ref 150–450)
NON-UH HIE RBC: 4.44 X10 (ref 4.2–5.4)
NON-UH HIE RDW: 14.4 % (ref 11.5–14.5)
NON-UH HIE TOTAL CHOL/HDL CHOL RATIO: 1.9
NON-UH HIE TOTAL PROTEIN: 7.4 G/DL (ref 5.7–8.2)
NON-UH HIE TRIGLYCERIDES: 75 MG/DL (ref 30–150)
NON-UH HIE TSH: 1.91 UIU/ML (ref 0.55–4.78)
NON-UH HIE VIT D 25: 16 NG/ML
NON-UH HIE WBC: 6.9 X10 (ref 4.5–11)

## 2025-03-04 RX ORDER — HYDROXYCHLOROQUINE SULFATE 200 MG/1
200 TABLET, FILM COATED ORAL 2 TIMES DAILY
Qty: 180 TABLET | Refills: 3 | Status: SHIPPED | OUTPATIENT
Start: 2025-03-04 | End: 2025-03-05 | Stop reason: SDUPTHER

## 2025-03-05 ENCOUNTER — APPOINTMENT (OUTPATIENT)
Dept: RHEUMATOLOGY | Facility: CLINIC | Age: 70
End: 2025-03-05
Payer: MEDICARE

## 2025-03-05 VITALS
SYSTOLIC BLOOD PRESSURE: 120 MMHG | BODY MASS INDEX: 30.62 KG/M2 | WEIGHT: 190.5 LBS | TEMPERATURE: 98.1 F | HEART RATE: 78 BPM | HEIGHT: 66 IN | DIASTOLIC BLOOD PRESSURE: 83 MMHG | OXYGEN SATURATION: 99 %

## 2025-03-05 DIAGNOSIS — M05.79 RHEUMATOID ARTHRITIS OF MULTIPLE SITES WITHOUT ORGAN OR SYSTEM INVOLVEMENT WITH POSITIVE RHEUMATOID FACTOR (MULTI): ICD-10-CM

## 2025-03-05 DIAGNOSIS — Z79.1 NSAID LONG-TERM USE: ICD-10-CM

## 2025-03-05 DIAGNOSIS — Z51.81 ENCOUNTER FOR MONITORING OF HYDROXYCHLOROQUINE THERAPY: Primary | ICD-10-CM

## 2025-03-05 DIAGNOSIS — Z79.899 ENCOUNTER FOR MONITORING OF HYDROXYCHLOROQUINE THERAPY: Primary | ICD-10-CM

## 2025-03-05 PROCEDURE — 99214 OFFICE O/P EST MOD 30 MIN: CPT | Performed by: INTERNAL MEDICINE

## 2025-03-05 PROCEDURE — 3008F BODY MASS INDEX DOCD: CPT | Performed by: INTERNAL MEDICINE

## 2025-03-05 PROCEDURE — 1036F TOBACCO NON-USER: CPT | Performed by: INTERNAL MEDICINE

## 2025-03-05 PROCEDURE — 1123F ACP DISCUSS/DSCN MKR DOCD: CPT | Performed by: INTERNAL MEDICINE

## 2025-03-05 PROCEDURE — 1159F MED LIST DOCD IN RCRD: CPT | Performed by: INTERNAL MEDICINE

## 2025-03-05 PROCEDURE — 1160F RVW MEDS BY RX/DR IN RCRD: CPT | Performed by: INTERNAL MEDICINE

## 2025-03-05 PROCEDURE — 1158F ADVNC CARE PLAN TLK DOCD: CPT | Performed by: INTERNAL MEDICINE

## 2025-03-05 RX ORDER — PREDNISONE 10 MG/1
TABLET ORAL
Qty: 30 TABLET | Refills: 0 | Status: SHIPPED | OUTPATIENT
Start: 2025-03-05 | End: 2025-03-17

## 2025-03-05 RX ORDER — HYDROXYCHLOROQUINE SULFATE 200 MG/1
200 TABLET, FILM COATED ORAL 2 TIMES DAILY
Qty: 180 TABLET | Refills: 3 | Status: SHIPPED | OUTPATIENT
Start: 2025-03-05 | End: 2026-03-05

## 2025-03-05 ASSESSMENT — ENCOUNTER SYMPTOMS
PSYCHIATRIC NEGATIVE: 1
ENDOCRINE NEGATIVE: 1
EYES NEGATIVE: 1
MYALGIAS: 0
NUMBNESS: 0
COLOR CHANGE: 0
ARTHRALGIAS: 1
WEAKNESS: 0
FATIGUE: 0
GASTROINTESTINAL NEGATIVE: 1
JOINT SWELLING: 1
COUGH: 0
FEVER: 0
BACK PAIN: 0
SHORTNESS OF BREATH: 0

## 2025-03-05 ASSESSMENT — PATIENT HEALTH QUESTIONNAIRE - PHQ9
2. FEELING DOWN, DEPRESSED OR HOPELESS: NOT AT ALL
SUM OF ALL RESPONSES TO PHQ9 QUESTIONS 1 & 2: 0
1. LITTLE INTEREST OR PLEASURE IN DOING THINGS: NOT AT ALL

## 2025-03-05 NOTE — ASSESSMENT & PLAN NOTE
You are on chronic plaquenil.     Make sure you see your eye doctor yearly.  Due this month    Plaquenil is dosed based on your weight.   We will make sure your dose is below the maximum daily dose of 5mg/kg

## 2025-03-05 NOTE — ASSESSMENT & PLAN NOTE
Pt wants to avoid escalating therapy.  Will try a steroid taper first.  If symptoms don't resolve, will add a daily prescription NSAID  Had labs done yesterday by PCP and will defer lab today  Orders:    Follow Up In Rheumatology; Future    hydroxychloroquine (Plaquenil) 200 mg tablet; Take 1 tablet (200 mg) by mouth 2 times a day.    predniSONE (Deltasone) 10 mg tablet; Take 4 tablets (40 mg) by mouth once daily for 3 days, THEN 3 tablets (30 mg) once daily for 3 days, THEN 2 tablets (20 mg) once daily for 3 days, THEN 1 tablet (10 mg) once daily for 3 days.

## 2025-03-05 NOTE — PROGRESS NOTES
[facility] RHEUMATOLOGY     AND INTERNAL MEDICINE    RHEUMATOLOGY PROGRESS NOTE    Kalpana Boothe 69 y.o. female  :  1955  PCP:  Gay Colón DO    Chief Complaint   Patient presents with    Rheumatoid Arthritis    Joint Pain       SUBJECTIVE  Pt states that last summer, she ended up going to ortho for wrist pain and injection helped.  She had been doing well until [place], has had persistent wrist and ankle pain.   Taking more ibuprofen.   Notes swelling in wrists but not ankles.     is doing better (getting monthly chemo)      Records since last seen reviewed in [facility], [facility] and [facility] Record  Patient Active Problem List    Diagnosis Date Noted    NSAID long-term use 2025    Encounter for monitoring of hydroxychloroquine therapy 2024    Low vitamin D level 2024    Osteoporosis 2024    Rheumatoid arthritis of multiple sites without organ or system involvement with positive rheumatoid factor (Multi) 2024     Past Medical History:   Diagnosis Date    Colon polyp 02/15/2008    Formatting of this note might be different from the original. Tubular adenoma 2006 repeat in     Consumes 2 to 3 servings of caffeine per day     De Quervain's disease (radial styloid tenosynovitis) 2015    Tendinitis 12/10/2015    History of tendinitis    Uterine fibroid 02/15/2008    Uterine prolapse 2008     Current Outpatient Medications on File Prior to Visit   Medication Sig Dispense Refill    calcium carbonate 600 mg calcium (1,500 mg) tablet Take 1 tablet (1,500 mg) by mouth once daily.      cholecalciferol (Vitamin D-3) 25 MCG (1000 UT) capsule Take 1 capsule (25 mcg) by mouth once daily.      ibuprofen (AdviL) 200 mg tablet Take 1 tablet (200 mg) by mouth 3 times a day as needed.      [DISCONTINUED] hydroxychloroquine (Plaquenil) 200 mg tablet TAKE 1 TABLET DAILY (Patient taking differently: Take  "by mouth 2 times a day.) 90 tablet 3     No current facility-administered medications on file prior to visit.     Allergies   Allergen Reactions    Penicillins Hives, Itching and Other     11/07/2005 UNKNOWN, PLEASE VERIFY, 11/07/2005 UNKNOWN, PLEASE VERIFY    Shrimp Unknown     Social History     Tobacco Use    Smoking status: Never    Smokeless tobacco: Never   Vaping Use    Vaping status: Never Used   Substance Use Topics    Alcohol use: Yes     Comment: Occasionally    Drug use: Never     Review of Systems   Constitutional:  Negative for fatigue and fever.   HENT: Negative.     Eyes: Negative.    Respiratory:  Negative for cough and shortness of breath.    Cardiovascular:  Negative for leg swelling.   Gastrointestinal: Negative.    Endocrine: Negative.    Genitourinary: Negative.    Musculoskeletal:  Positive for arthralgias and joint swelling. Negative for back pain, gait problem and myalgias.   Skin:  Negative for color change and rash.   Neurological:  Negative for weakness and numbness.   Psychiatric/Behavioral: Negative.         PHYSICAL EXAM  /83   Pulse 78   Temp 36.7 °C (98.1 °F) (Temporal)   Ht 1.676 m (5' 6\")   Wt 86.4 kg (190 lb 8 oz)   SpO2 99%   BMI 30.75 kg/m²   Depression: Not at risk (3/5/2025)    PHQ-2     PHQ-2 Score: 0     Physical Exam  Vitals reviewed.   Constitutional:       General: She is not in acute distress.     Appearance: Normal appearance.   HENT:      Head: Normocephalic and atraumatic.   Eyes:      General: No scleral icterus.     Extraocular Movements: Extraocular movements intact.      Conjunctiva/sclera: Conjunctivae normal.      Pupils: Pupils are equal, round, and reactive to light.   Pulmonary:      Effort: Pulmonary effort is normal. No respiratory distress.   Musculoskeletal:         General: Swelling present. No tenderness or deformity.      Cervical back: Normal range of motion and neck supple. No tenderness.      Right lower leg: No edema.      Left lower " leg: No edema.      Comments: B wrist swelling but no other synovitis in other joints   Skin:     Coloration: Skin is not pale.      Findings: No erythema or rash.   Neurological:      General: No focal deficit present.      Mental Status: She is alert and oriented to person, place, and time. Mental status is at baseline.      Gait: Gait normal.   Psychiatric:         Mood and Affect: Mood normal.             Health Maintenance Due   Topic Date Due    Hepatitis C Screening  Never done    Diabetes Screening  Never done    Zoster Vaccines (2 of 3) 12/05/2014    COVID-19 Vaccine (4 - 2024-25 season) 09/01/2024       Assessment/plan  Assessment & Plan  Rheumatoid arthritis of multiple sites without organ or system involvement with positive rheumatoid factor (Multi)  Pt wants to avoid escalating therapy.  Will try a steroid taper first.  If symptoms don't resolve, will add a daily prescription NSAID  Had labs done yesterday by PCP and will defer lab today  Orders:    Follow Up In Rheumatology; Future    hydroxychloroquine (Plaquenil) 200 mg tablet; Take 1 tablet (200 mg) by mouth 2 times a day.    predniSONE (Deltasone) 10 mg tablet; Take 4 tablets (40 mg) by mouth once daily for 3 days, THEN 3 tablets (30 mg) once daily for 3 days, THEN 2 tablets (20 mg) once daily for 3 days, THEN 1 tablet (10 mg) once daily for 3 days.    Encounter for monitoring of hydroxychloroquine therapy  You are on chronic plaquenil.     Make sure you see your eye doctor yearly.  Due this month    Plaquenil is dosed based on your weight.   We will make sure your dose is below the maximum daily dose of 5mg/kg         NSAID long-term use           Follow up: ___6__months, sooner if change in symptoms    Immunization History   Administered Date(s) Administered    Flu vaccine (IIV4), preservative free *Check age/dose* 10/27/2007, 11/08/2008, 10/22/2009, 10/09/2015, 10/26/2017, 10/16/2018, 10/09/2020    Flu vaccine, quadrivalent, high-dose,  preservative free, age 65y+ (FLUZONE) 10/01/2002, 10/20/2005    Flu vaccine, quadrivalent, no egg protein, age 6 month or greater (FLUCELVAX) 10/14/2019    Flu vaccine, trivalent, preservative free, age 6 months and greater (Fluarix/Fluzone/Flulaval) 10/10/2014, 11/02/2016    Hepatitis B vaccine, adult *Check Product/Dose* 08/23/2000, 09/29/2000, 03/29/2001    Influenza, Seasonal, Quadrivalent, Adjuvanted 10/08/2021, 10/08/2022, 10/06/2023    Influenza, Unspecified 10/01/2010    Influenza, seasonal, injectable 10/01/2010, 10/09/2015, 10/30/2024    Moderna SARS-CoV-2 Vaccination 03/01/2021, 03/31/2021, 11/04/2021    Pneumococcal conjugate vaccine, 13-valent (PREVNAR 13) 02/11/2021    Pneumococcal polysaccharide vaccine, 23-valent, age 2 years and older (PNEUMOVAX 23) 02/28/2022    Tdap vaccine, age 7 year and older (BOOSTRIX, ADACEL) 06/28/2000, 10/01/2010, 02/10/2020    Zoster, live 10/10/2014

## 2025-03-07 ENCOUNTER — TELEPHONE (OUTPATIENT)
Dept: PRIMARY CARE | Facility: CLINIC | Age: 70
End: 2025-03-07
Payer: MEDICARE

## 2025-03-07 DIAGNOSIS — E87.5 HYPERKALEMIA: ICD-10-CM

## 2025-03-07 DIAGNOSIS — R79.89 ELEVATED SERUM CREATININE: ICD-10-CM

## 2025-03-07 DIAGNOSIS — E55.9 VITAMIN D DEFICIENCY: ICD-10-CM

## 2025-03-07 RX ORDER — ERGOCALCIFEROL 1.25 MG/1
50000 CAPSULE ORAL WEEKLY
Qty: 12 CAPSULE | Refills: 0 | Status: SHIPPED | OUTPATIENT
Start: 2025-03-07 | End: 2025-05-30

## 2025-03-07 NOTE — TELEPHONE ENCOUNTER
----- Message from Gay Colón sent at 3/6/2025  8:00 PM EST -----  Please let pt know that her blood counts, sugar, thyroid, liver function are all normal and her  cholesterol was good    Her kidney function and potassium level was slightly elevated.  I would like to recheck this again next week.  Enter order for nonfasting labs BMP diagnosis elevated serum creatinine and hyperkalemia.    Her vitamin D level is low at 16(should be above 30).  I recommend she take a higher dose prescription strength vitamin D once a week for 12 weeks and then we can recheck.  Please enter rx for vitamin D 50, 000 units once a week #12 no refills dx: vitamin D deficiency  ----- Message -----  From: Mary Moyer CMA  Sent: 3/6/2025   1:15 PM EST  To: Gay Colón, DO

## 2025-03-17 LAB
NON-UH HIE BUN/CREAT RATIO: 21.1
NON-UH HIE BUN: 19 MG/DL (ref 9–23)
NON-UH HIE CALCIUM: 9.6 MG/DL (ref 8.7–10.4)
NON-UH HIE CALCULATED OSMOLALITY: 283 MOSM/KG (ref 275–295)
NON-UH HIE CHLORIDE: 102 MMOL/L (ref 98–107)
NON-UH HIE CO2, VENOUS: 29 MMOL/L (ref 20–31)
NON-UH HIE CREATININE: 0.9 MG/DL (ref 0.5–0.8)
NON-UH HIE GFR AA: >60
NON-UH HIE GLOMERULAR FILTRATION RATE: >60 ML/MIN/1.73M?
NON-UH HIE GLUCOSE: 87 MG/DL (ref 74–106)
NON-UH HIE K: 4.1 MMOL/L (ref 3.5–5.1)
NON-UH HIE NA: 141 MMOL/L (ref 135–145)

## 2025-03-18 DIAGNOSIS — R79.89 ELEVATED SERUM CREATININE: ICD-10-CM

## 2025-03-19 ENCOUNTER — HOSPITAL ENCOUNTER (OUTPATIENT)
Dept: RADIOLOGY | Facility: EXTERNAL LOCATION | Age: 70
Discharge: HOME | End: 2025-03-19

## 2025-03-19 DIAGNOSIS — Z12.31 BREAST CANCER SCREENING BY MAMMOGRAM: ICD-10-CM

## 2025-04-08 ENCOUNTER — TELEPHONE (OUTPATIENT)
Dept: PRIMARY CARE | Facility: CLINIC | Age: 70
End: 2025-04-08
Payer: MEDICARE

## 2025-04-08 DIAGNOSIS — N63.10 MASS OF RIGHT BREAST, UNSPECIFIED QUADRANT: ICD-10-CM

## 2025-04-08 DIAGNOSIS — R92.8 OTHER ABNORMAL AND INCONCLUSIVE FINDINGS ON DIAGNOSTIC IMAGING OF BREAST: ICD-10-CM

## 2025-04-08 NOTE — TELEPHONE ENCOUNTER
----- Message from Gay Colón sent at 4/8/2025  2:17 PM EDT -----  Let pt know her mammogram was negative but the radiologist recommends checking an US to further evaluated the right breast lump   please enter order for R breast US dx right breast lump

## 2025-06-07 DIAGNOSIS — N30.00 ACUTE CYSTITIS WITHOUT HEMATURIA: Primary | ICD-10-CM

## 2025-06-07 DIAGNOSIS — R79.89 ELEVATED SERUM CREATININE: ICD-10-CM

## 2025-06-07 DIAGNOSIS — E55.9 VITAMIN D DEFICIENCY: ICD-10-CM

## 2025-06-07 DIAGNOSIS — R82.998 LEUKOCYTES IN URINE: ICD-10-CM

## 2025-06-07 DIAGNOSIS — R80.9 PROTEINURIA, UNSPECIFIED TYPE: ICD-10-CM

## 2025-06-10 ENCOUNTER — TELEPHONE (OUTPATIENT)
Dept: PRIMARY CARE | Facility: CLINIC | Age: 70
End: 2025-06-10
Payer: MEDICARE

## 2025-06-10 LAB
NON-UH HIE APPEARANCE, U: ABNORMAL
NON-UH HIE BACTERIA, U: ABNORMAL
NON-UH HIE BILIRUBIN, U: ABNORMAL
NON-UH HIE BLOOD, U: ABNORMAL
NON-UH HIE BUN/CREAT RATIO: 14.4
NON-UH HIE BUN: 13 MG/DL (ref 9–23)
NON-UH HIE CALCIUM: 9.3 MG/DL (ref 8.7–10.4)
NON-UH HIE CALCULATED OSMOLALITY: 279 MOSM/KG (ref 275–295)
NON-UH HIE CHLORIDE: 105 MMOL/L (ref 98–107)
NON-UH HIE CO2, VENOUS: 27 MMOL/L (ref 20–31)
NON-UH HIE COLOR, U: ABNORMAL
NON-UH HIE CREATININE: 0.9 MG/DL (ref 0.5–0.8)
NON-UH HIE GFR AA: >60
NON-UH HIE GLOMERULAR FILTRATION RATE: >60 ML/MIN/1.73M?
NON-UH HIE GLUCOSE QUAL, U: ABNORMAL
NON-UH HIE GLUCOSE: 93 MG/DL (ref 74–106)
NON-UH HIE K: 3.9 MMOL/L (ref 3.5–5.1)
NON-UH HIE KETONES, U: ABNORMAL
NON-UH HIE LEUKOCYTE ESTERASE, U: ABNORMAL
NON-UH HIE NA: 140 MMOL/L (ref 135–145)
NON-UH HIE NITRITE, U: ABNORMAL
NON-UH HIE NON-SQUAMOUS EPITHELIAL, U: <1 #/HPF
NON-UH HIE PH, U: 5.5 (ref 4.5–8)
NON-UH HIE PROTEIN, U: ABNORMAL
NON-UH HIE RBC/HPF, U: <1 #/HPF (ref 0–3)
NON-UH HIE SPECIFIC GRAVITY, U: 1.01 (ref 1–1.03)
NON-UH HIE SQUAMOUS EPITHELIAL CELLS, U: <1 #/HPF
NON-UH HIE U MICRO: ABNORMAL
NON-UH HIE UROBILINOGEN QUAL, U: ABNORMAL
NON-UH HIE VIT D 25: 44 NG/ML
NON-UH HIE WBC/HPF, U: 4 #/HPF (ref 0–5)

## 2025-06-10 NOTE — TELEPHONE ENCOUNTER
----- Message from Gay Colón sent at 6/10/2025  3:38 PM EDT -----  Please let pt know her repeat kidney function was again slightly elevated at 0.9 (should be less than 0.8).  We will continue to monitor.  Her repeat vitamin D is now in normal at 44.  Normal ranges .   I recommend he take at least 2000 units of an OTC vitamin D supplement daily to maintain it.    ----- Message -----  From: Jeremie Mcnulty - Lab Results In  Sent: 6/10/2025   1:30 PM EDT  To: aGy Colón, DO

## 2025-06-12 ENCOUNTER — TELEPHONE (OUTPATIENT)
Dept: PRIMARY CARE | Facility: CLINIC | Age: 70
End: 2025-06-12
Payer: MEDICARE

## 2025-06-12 RX ORDER — AZITHROMYCIN 250 MG/1
TABLET, FILM COATED ORAL
Qty: 6 TABLET | Refills: 0 | Status: SHIPPED | OUTPATIENT
Start: 2025-06-12 | End: 2025-06-17

## 2025-06-12 NOTE — TELEPHONE ENCOUNTER
----- Message from Gay Colón sent at 6/11/2025  5:08 PM EDT -----  Please let patient know her urine culture showed a very small amount of bacterial growth.  We can send a rx for an antibiotic.   enter rx zpak  ----- Message -----  From: Jeremie Mcnulty - Lab Results In  Sent: 6/11/2025   1:53 PM EDT  To: Gay Colón, DO

## 2025-06-19 DIAGNOSIS — N39.0 URINARY TRACT INFECTION WITHOUT HEMATURIA, SITE UNSPECIFIED: ICD-10-CM

## 2025-06-19 RX ORDER — CLINDAMYCIN HYDROCHLORIDE 300 MG/1
300 CAPSULE ORAL 2 TIMES DAILY
Qty: 10 CAPSULE | Refills: 0 | Status: SHIPPED | OUTPATIENT
Start: 2025-06-19 | End: 2025-06-24

## 2025-06-20 ENCOUNTER — TELEPHONE (OUTPATIENT)
Dept: PRIMARY CARE | Facility: CLINIC | Age: 70
End: 2025-06-20
Payer: MEDICARE

## 2025-06-20 NOTE — TELEPHONE ENCOUNTER
----- Message from Gay Colón sent at 6/19/2025  3:46 PM EDT -----  Please let pt  know her repeat urine culture was still positive.   Let her know we can send a different antibiotic- enter rx clindamycin 300mg one BID x 5 days    ----- Message -----  From: Jeremie Mcnulty - Lab Results In  Sent: 6/19/2025   2:23 PM EDT  To: Gay Colón, DO

## 2025-06-25 ENCOUNTER — CLINICAL SUPPORT (OUTPATIENT)
Dept: PRIMARY CARE | Facility: CLINIC | Age: 70
End: 2025-06-25
Payer: MEDICARE

## 2025-06-25 DIAGNOSIS — N39.0 URINARY TRACT INFECTION WITHOUT HEMATURIA, SITE UNSPECIFIED: ICD-10-CM

## 2025-06-27 LAB — BACTERIA UR CULT: NORMAL

## 2025-06-30 ENCOUNTER — TELEPHONE (OUTPATIENT)
Dept: PRIMARY CARE | Facility: CLINIC | Age: 70
End: 2025-06-30
Payer: MEDICARE

## 2025-06-30 NOTE — TELEPHONE ENCOUNTER
----- Message from Gay Colón sent at 6/29/2025  8:43 PM EDT -----  Please let pt know their urine culture was negative for an infection.  ----- Message -----  From: [facility] Results In  Sent: 6/27/2025   8:15 AM EDT  To: Gay Colón, DO

## 2025-08-07 ENCOUNTER — OFFICE VISIT (OUTPATIENT)
Dept: PRIMARY CARE | Facility: CLINIC | Age: 70
End: 2025-08-07
Payer: MEDICARE

## 2025-08-07 VITALS
HEIGHT: 66 IN | TEMPERATURE: 98.5 F | WEIGHT: 187.8 LBS | DIASTOLIC BLOOD PRESSURE: 84 MMHG | BODY MASS INDEX: 30.18 KG/M2 | SYSTOLIC BLOOD PRESSURE: 144 MMHG | HEART RATE: 96 BPM

## 2025-08-07 DIAGNOSIS — J02.9 SORE THROAT: ICD-10-CM

## 2025-08-07 LAB — POC RAPID STREP: NEGATIVE

## 2025-08-07 PROCEDURE — 1036F TOBACCO NON-USER: CPT | Performed by: FAMILY MEDICINE

## 2025-08-07 PROCEDURE — 3008F BODY MASS INDEX DOCD: CPT | Performed by: FAMILY MEDICINE

## 2025-08-07 PROCEDURE — 1160F RVW MEDS BY RX/DR IN RCRD: CPT | Performed by: FAMILY MEDICINE

## 2025-08-07 PROCEDURE — 1159F MED LIST DOCD IN RCRD: CPT | Performed by: FAMILY MEDICINE

## 2025-08-07 PROCEDURE — 87880 STREP A ASSAY W/OPTIC: CPT | Performed by: FAMILY MEDICINE

## 2025-08-07 PROCEDURE — 99213 OFFICE O/P EST LOW 20 MIN: CPT | Performed by: FAMILY MEDICINE

## 2025-08-07 NOTE — PROGRESS NOTES
"Subjective   Patient ID: Karly Boothe is a 69 y.o. female who presents for Sore Throat (Sore throat, headache, and fever since Monday.  Tested negative for covid yesterday. ).    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 EOMI; PERRLA; lids normal; TM's clear;  PND; no posterior pharyngeal exudate  Neck: supple; No lymphadenopathy   Heart: RRR     Lungs: CTA bilateral    Neuro: No gross neuro deficits     Psych: Judgment, orientation, mood, affect, insight wnl   Assessment/Plan   Problem List Items Addressed This Visit    None  Visit Diagnoses         Codes      Sore throat     J02.9    Relevant Orders    POCT Rapid Strep A manually resulted (Completed)          Check rapid strep-negative  Supportive treatment.  Likely viral.  Follow-up if symptoms persist or worsen  Monitor blood pressure     "

## 2025-09-05 ENCOUNTER — APPOINTMENT (OUTPATIENT)
Dept: RHEUMATOLOGY | Facility: CLINIC | Age: 70
End: 2025-09-05
Payer: MEDICARE

## 2025-09-05 ASSESSMENT — ENCOUNTER SYMPTOMS
ARTHRALGIAS: 1
FEVER: 0
PSYCHIATRIC NEGATIVE: 1
MYALGIAS: 0
WEAKNESS: 0
BACK PAIN: 0
ENDOCRINE NEGATIVE: 1
COUGH: 0
JOINT SWELLING: 0
NUMBNESS: 0
GASTROINTESTINAL NEGATIVE: 1
COLOR CHANGE: 0
SHORTNESS OF BREATH: 0
EYES NEGATIVE: 1
FATIGUE: 0

## 2025-09-05 ASSESSMENT — PATIENT HEALTH QUESTIONNAIRE - PHQ9
1. LITTLE INTEREST OR PLEASURE IN DOING THINGS: SEVERAL DAYS
SUM OF ALL RESPONSES TO PHQ9 QUESTIONS 1 AND 2: 2
2. FEELING DOWN, DEPRESSED OR HOPELESS: SEVERAL DAYS

## 2026-02-03 ENCOUNTER — APPOINTMENT (OUTPATIENT)
Dept: PRIMARY CARE | Facility: CLINIC | Age: 71
End: 2026-02-03
Payer: MEDICARE

## 2026-03-06 ENCOUNTER — APPOINTMENT (OUTPATIENT)
Dept: RHEUMATOLOGY | Facility: CLINIC | Age: 71
End: 2026-03-06
Payer: MEDICARE